# Patient Record
Sex: MALE | Race: WHITE | Employment: FULL TIME | URBAN - METROPOLITAN AREA
[De-identification: names, ages, dates, MRNs, and addresses within clinical notes are randomized per-mention and may not be internally consistent; named-entity substitution may affect disease eponyms.]

---

## 2022-05-27 ENCOUNTER — HOSPITAL ENCOUNTER (OUTPATIENT)
Age: 22
Discharge: HOME | End: 2022-05-27

## 2022-05-27 LAB
ALBUMIN SERPL-MCNC: 5 G/DL (ref 3.5–5.1)
ALP BLD-CCNC: 67 U/L (ref 38–126)
ALT SERPL-CCNC: 25 U/L (ref 11–66)
ANION GAP SERPL CALCULATED.3IONS-SCNC: 11 MEQ/L (ref 8–16)
AST SERPL-CCNC: 31 U/L (ref 5–40)
BASOPHILS # BLD: 0.7 %
BASOPHILS ABSOLUTE: 0 THOU/MM3 (ref 0–0.1)
BILIRUB SERPL-MCNC: 0.6 MG/DL (ref 0.3–1.2)
BUN BLDV-MCNC: 16 MG/DL (ref 7–22)
CALCIUM SERPL-MCNC: 9.6 MG/DL (ref 8.5–10.5)
CHLORIDE BLD-SCNC: 106 MEQ/L (ref 98–111)
CHOLESTEROL, TOTAL: 194 MG/DL (ref 100–199)
CO2: 25 MEQ/L (ref 23–33)
CREAT SERPL-MCNC: 0.9 MG/DL (ref 0.4–1.2)
EOSINOPHIL # BLD: 1.7 %
EOSINOPHILS ABSOLUTE: 0.1 THOU/MM3 (ref 0–0.4)
ERYTHROCYTE [DISTWIDTH] IN BLOOD BY AUTOMATED COUNT: 11.5 % (ref 11.5–14.5)
ERYTHROCYTE [DISTWIDTH] IN BLOOD BY AUTOMATED COUNT: 36.9 FL (ref 35–45)
GFR SERPL CREATININE-BSD FRML MDRD: > 90 ML/MIN/1.73M2
GLUCOSE BLD-MCNC: 83 MG/DL (ref 70–108)
HCT VFR BLD CALC: 49.6 % (ref 42–52)
HDLC SERPL-MCNC: 63 MG/DL
HEMOGLOBIN: 16.5 GM/DL (ref 14–18)
IMMATURE GRANS (ABS): 0.01 THOU/MM3 (ref 0–0.07)
IMMATURE GRANULOCYTES: 0.2 %
LDL CHOLESTEROL CALCULATED: 117 MG/DL
LYMPHOCYTES # BLD: 46.8 %
LYMPHOCYTES ABSOLUTE: 2.7 THOU/MM3 (ref 1–4.8)
MCH RBC QN AUTO: 29.5 PG (ref 26–33)
MCHC RBC AUTO-ENTMCNC: 33.3 GM/DL (ref 32.2–35.5)
MCV RBC AUTO: 88.7 FL (ref 80–94)
MONOCYTES # BLD: 9.1 %
MONOCYTES ABSOLUTE: 0.5 THOU/MM3 (ref 0.4–1.3)
NUCLEATED RED BLOOD CELLS: 0 /100 WBC
PLATELET # BLD: 240 THOU/MM3 (ref 130–400)
PMV BLD AUTO: 10 FL (ref 9.4–12.4)
POTASSIUM SERPL-SCNC: 4.1 MEQ/L (ref 3.5–5.2)
RBC # BLD: 5.59 MILL/MM3 (ref 4.7–6.1)
SEG NEUTROPHILS: 41.5 %
SEGMENTED NEUTROPHILS ABSOLUTE COUNT: 2.4 THOU/MM3 (ref 1.8–7.7)
SODIUM BLD-SCNC: 142 MEQ/L (ref 135–145)
TOTAL PROTEIN: 7.5 G/DL (ref 6.1–8)
TRIGL SERPL-MCNC: 69 MG/DL (ref 0–199)
WBC # BLD: 5.7 THOU/MM3 (ref 4.8–10.8)

## 2022-12-03 ENCOUNTER — HOSPITAL ENCOUNTER (EMERGENCY)
Age: 22
Discharge: HOME | End: 2022-12-03
Attending: EMERGENCY MEDICINE
Payer: COMMERCIAL

## 2022-12-03 ENCOUNTER — APPOINTMENT (OUTPATIENT)
Dept: GENERAL RADIOLOGY | Age: 22
End: 2022-12-03
Payer: COMMERCIAL

## 2022-12-03 VITALS
HEIGHT: 66 IN | TEMPERATURE: 98.9 F | HEART RATE: 72 BPM | SYSTOLIC BLOOD PRESSURE: 135 MMHG | DIASTOLIC BLOOD PRESSURE: 77 MMHG | OXYGEN SATURATION: 97 % | WEIGHT: 150 LBS | RESPIRATION RATE: 18 BRPM | BODY MASS INDEX: 24.11 KG/M2

## 2022-12-03 DIAGNOSIS — S46.002A ROTATOR CUFF INJURY, LEFT, INITIAL ENCOUNTER: Primary | ICD-10-CM

## 2022-12-03 DIAGNOSIS — S49.92XA SHOULDER INJURY, LEFT, INITIAL ENCOUNTER: ICD-10-CM

## 2022-12-03 PROCEDURE — 73030 X-RAY EXAM OF SHOULDER: CPT

## 2022-12-03 PROCEDURE — 6360000002 HC RX W HCPCS: Performed by: STUDENT IN AN ORGANIZED HEALTH CARE EDUCATION/TRAINING PROGRAM

## 2022-12-03 PROCEDURE — 99284 EMERGENCY DEPT VISIT MOD MDM: CPT

## 2022-12-03 PROCEDURE — 6370000000 HC RX 637 (ALT 250 FOR IP): Performed by: STUDENT IN AN ORGANIZED HEALTH CARE EDUCATION/TRAINING PROGRAM

## 2022-12-03 PROCEDURE — 96372 THER/PROPH/DIAG INJ SC/IM: CPT

## 2022-12-03 RX ORDER — HYDROCODONE BITARTRATE AND ACETAMINOPHEN 5; 325 MG/1; MG/1
1 TABLET ORAL EVERY 4 HOURS PRN
Qty: 18 TABLET | Refills: 0 | Status: SHIPPED | OUTPATIENT
Start: 2022-12-03 | End: 2022-12-06

## 2022-12-03 RX ORDER — KETOROLAC TROMETHAMINE 30 MG/ML
30 INJECTION, SOLUTION INTRAMUSCULAR; INTRAVENOUS ONCE
Status: COMPLETED | OUTPATIENT
Start: 2022-12-03 | End: 2022-12-03

## 2022-12-03 RX ORDER — HYDROCODONE BITARTRATE AND ACETAMINOPHEN 10; 325 MG/1; MG/1
1 TABLET ORAL ONCE
Status: COMPLETED | OUTPATIENT
Start: 2022-12-03 | End: 2022-12-03

## 2022-12-03 RX ORDER — KETOROLAC TROMETHAMINE 30 MG/ML
60 INJECTION, SOLUTION INTRAMUSCULAR; INTRAVENOUS ONCE
Status: DISCONTINUED | OUTPATIENT
Start: 2022-12-03 | End: 2022-12-03

## 2022-12-03 RX ADMIN — HYDROCODONE BITARTRATE AND ACETAMINOPHEN 1 TABLET: 10; 325 TABLET ORAL at 21:00

## 2022-12-03 RX ADMIN — KETOROLAC TROMETHAMINE 30 MG: 30 INJECTION, SOLUTION INTRAMUSCULAR at 21:03

## 2022-12-03 ASSESSMENT — PAIN SCALES - GENERAL
PAINLEVEL_OUTOF10: 10
PAINLEVEL_OUTOF10: 10

## 2022-12-03 ASSESSMENT — ENCOUNTER SYMPTOMS
GASTROINTESTINAL NEGATIVE: 1
RESPIRATORY NEGATIVE: 1
EYES NEGATIVE: 1

## 2022-12-03 ASSESSMENT — PAIN DESCRIPTION - LOCATION: LOCATION: SHOULDER

## 2022-12-03 ASSESSMENT — PAIN - FUNCTIONAL ASSESSMENT: PAIN_FUNCTIONAL_ASSESSMENT: 0-10

## 2022-12-03 ASSESSMENT — PAIN DESCRIPTION - ORIENTATION: ORIENTATION: LEFT

## 2022-12-03 NOTE — Clinical Note
Samina Rodriguez was seen and treated in our emergency department on 12/3/2022. He may return to work on 12/07/2022. However, patient may return sooner if his symptoms allow. If you have any questions or concerns, please don't hesitate to call.       Nina Bates MD

## 2022-12-04 NOTE — DISCHARGE INSTRUCTIONS
[facility] location and hours. [address] [facility], [address] [place] [address]  [phone]  Monday - Thursday  8:00 am - 4:00 pm  Friday 8:00am - 3:00pm    Remember, you got 325 mg of tylenol in the norco in the ED. And each norco has an additional 325 mg of tylenol. Stay below 4000 mg of tylenol in 24 hours. Do not drink alcohol during this period of time. Additionally, you can add either ibuprofen or aleve to help you with pain.

## 2022-12-04 NOTE — ED TRIAGE NOTES
Pt presents to the ER with a left shoulder injury from player football. Pt states he thinks his shoulder is \"popped out. \" No obvious deformity noted. VSS.

## 2022-12-04 NOTE — ED PROVIDER NOTES
PATIENT NAME: Kranthi Severino  MRN: [id number]  : 2000  AYALA: 12/3/2022    I performed a history and physical examination of the patient and discussed management with the Resident. I reviewed the Resident's note and agree with the documented findings and plan of care. Any areas of disagreement are noted on the chart. I was personally present for the key portions of any procedures and have documented in the chart those procedures where I was not present during the key portions. I have reviewed the emergency nurses triage note and agree with the chief complaint, past medical history, past surgical history, allergies, medications, social and family history as documented unless otherwise noted below. MEDICAL DEDISION MAKING (MDM)     Kranthi Severino is a 25 y.o. male who presents to Emergency Department with Shoulder Injury (Left)     Patient injured left shoulder while he was playing football. He will not use left shoulder due to severe pain. Pain is rated at 10/10. No left shoulder deformity  Physical exam: AVSS, cardiopulmonary exam unremarkable. Abdomen soft. Left shoulder has no swelling, no deformity, no range of motion due to severe pain. Patient has significant tenderness to left supraspinous tendon area. Left shoulder x-ray has no acute abnormality. Clinical I suspect this is rotator cuff injury. Arm sling is given ED. Discharged with orthopedic follow-up in a week. OTC ibuprofen for pain. Piyush Search is prescribed for pain breakthrough. Vitals:    22 1923   BP: 135/77   Pulse: 72   Resp: 18   Temp: 98.9 °F (37.2 °C)   TempSrc: Oral   SpO2: 97%   Weight: 150 lb (68 kg)   Height: 5' 6\" (1.676 m)     Medications   HYDROcodone-acetaminophen (NORCO)  MG per tablet 1 tablet (1 tablet Oral Given 12/3/22 2100)   ketorolac (TORADOL) injection 30 mg (30 mg IntraMUSCular Given 12/3/22 2103)     Labs Reviewed - No data to display  XR SHOULDER LEFT (MIN 2 VIEWS)   Final Result   1.  No acute

## 2022-12-04 NOTE — ED PROVIDER NOTES
[facility] ENCOUNTER          Pt Name: Kranthi Severino  MRN: [id number]  [place] 2000  Date of evaluation: 12/3/2022  Treating Resident Physician: Mukul Cavanaugh MD  Supervising Physician: Dr. Nusrat Love       Chief Complaint   Patient presents with    Shoulder Injury     Left         HISTORY OF PRESENT ILLNESS    HPI  Kranthi Severino is a 25 y.o. male who presents to the emergency department for evaluation of   Left shoulder pain that started after being hit in tackle football without pads today. Patient reports he felt a \"pop\" but was able to move his left shoulder and arm immediately after. However, patient reports pain has steadily increased since, now 10/10 along with swelling. He denies changes in sensation of his left arm. The patient has no other acute complaints at this time. REVIEW OF SYSTEMS   Review of Systems   Constitutional: Negative. HENT: Negative. Eyes: Negative. Respiratory: Negative. Cardiovascular: Negative. Gastrointestinal: Negative. Musculoskeletal:         Left shoulder pain       PAST MEDICAL AND SURGICAL HISTORY   No past medical history on file. No past surgical history on file. MEDICATIONS   No current facility-administered medications for this encounter. Current Outpatient Medications:     HYDROcodone-acetaminophen (NORCO) 5-325 MG per tablet, Take 1 tablet by mouth every 4 hours as needed for Pain for up to 3 days. Intended supply: 3 days. Take lowest dose possible to manage pain, Disp: 18 tablet, Rfl: 0      SOCIAL HISTORY     Social History     Social History Narrative    Not on file            ALLERGIES     Allergies   Allergen Reactions    Penicillins Hives         FAMILY HISTORY   No family history on file.       PHYSICAL EXAM     ED Triage Vitals [12/03/22 1923]   BP Temp Temp Source Heart Rate Resp SpO2 Height Weight   135/77 98.9 °F (37.2 °C) Oral 72 18 97 % 5' 6\" (1.676 m) 150 lb (68 kg)       Additional Vital Signs:  Vitals:    12/03/22 1923   BP: 135/77   Pulse: 72   Resp: 18   Temp: 98.9 °F (37.2 °C)   SpO2: 97%       Physical Exam:  GENERAL: Alert and oriented. Not moving left arm. EYES: No erythema, drainage, or icterus. HEART: Normal S1/S2. No murmurs. LUNGS: Clear to auscultation bilaterally. ABDOMEN: Soft and non-tender. EXTREMITIES: Tender to palpation in the posterior aspect of shoulder. Passive and active ROM is limited by significant pain. NEUROLOGICAL: Grossly normal. No changes in  strength of left hand compared to right. Full range of motion without pain at the elbow joint. No changes in sensation. SKIN: no rashes    MEDICAL DECISION MAKING   Initial Assessment:   24 yo male with trauma to his shoulder during football. Ddx acute fracture, dislocation, labral damage, rotator cuff injury. Plan:   Shoulder xray  Pain control      ED RESULTS   Laboratory results:  Labs Reviewed - No data to display    Radiologic studies results:  XR SHOULDER LEFT (MIN 2 VIEWS)   Final Result   1. No acute bony abnormality            **This report has been created using voice recognition software. It may contain minor errors which are inherent in voice recognition technology. **      Final report electronically signed by Dr Sarah Worrell on 12/3/2022 8:20 PM          ED Medications administered this visit:   Medications   HYDROcodone-acetaminophen (NORCO)  MG per tablet 1 tablet (1 tablet Oral Given 12/3/22 2100)   ketorolac (TORADOL) injection 30 mg (30 mg IntraMUSCular Given 12/3/22 2103)         ED COURSE      Shoulder xray was negative for dislocation or fracture. Strict return precautions regarding changes in sensation or severe increase in shoulder/arm pain, or weakness of left upper extremity were discusssed and follow up instructions were discussed with the patient prior to discharge, with which the patient agrees.       MEDICATION CHANGES     New Prescriptions    HYDROCODONE-ACETAMINOPHEN (NORCO) 5-325 MG PER TABLET    Take 1 tablet by mouth every 4 hours as needed for Pain for up to 3 days. Intended supply: 3 days. Take lowest dose possible to manage pain         FINAL DISPOSITION     Final diagnoses:   Rotator cuff injury, left, initial encounter   Shoulder injury, left, initial encounter     Will prescribe 3 day course of norco. Advised patient to use tylenol and ibuprofen as well. Patient agreeable to walk in clinic at [facility] on Monday for further evaluation. Cautioned patient against drinking while on tylenol. Discussed tylenol as an ingredient in norco prescribed and to stay under 4000 mg daily total while adding all sources of tylenol. Condition: condition: stable  Dispo: Discharge to home    Follow-up Plans:  [address]  Go on 12/5/2022  to walk in clinic to be evaluated      This transcription was electronically signed. Parts of this transcriptions may have been dictated by use of voice recognition software and electronically transcribed, and parts may have been transcribed with the assistance of an ED scribe. The transcription may contain errors not detected in proofreading. Please refer to my supervising physician's documentation if my documentation differs.          Latrice Nguyễn MD  Resident  12/03/22 0643

## 2023-12-27 ENCOUNTER — HOSPITAL ENCOUNTER (EMERGENCY)
Age: 23
Discharge: HOME | End: 2023-12-28
Attending: EMERGENCY MEDICINE
Payer: COMMERCIAL

## 2023-12-27 DIAGNOSIS — R42 DIZZINESS: Primary | ICD-10-CM

## 2023-12-27 PROCEDURE — 99284 EMERGENCY DEPT VISIT MOD MDM: CPT

## 2023-12-27 PROCEDURE — 93005 ELECTROCARDIOGRAM TRACING: CPT | Performed by: EMERGENCY MEDICINE

## 2023-12-28 VITALS
WEIGHT: 145 LBS | DIASTOLIC BLOOD PRESSURE: 72 MMHG | TEMPERATURE: 98.7 F | RESPIRATION RATE: 16 BRPM | BODY MASS INDEX: 23.4 KG/M2 | OXYGEN SATURATION: 98 % | SYSTOLIC BLOOD PRESSURE: 120 MMHG | HEART RATE: 72 BPM

## 2023-12-28 LAB
ANION GAP SERPL CALC-SCNC: 11 MEQ/L (ref 8–16)
BASOPHILS ABSOLUTE: 0 THOU/MM3 (ref 0–0.1)
BASOPHILS NFR BLD AUTO: 0.4 %
BUN SERPL-MCNC: 21 MG/DL (ref 7–22)
CALCIUM SERPL-MCNC: 9.4 MG/DL (ref 8.5–10.5)
CHLORIDE SERPL-SCNC: 104 MEQ/L (ref 98–111)
CO2 SERPL-SCNC: 23 MEQ/L (ref 23–33)
CREAT SERPL-MCNC: 0.8 MG/DL (ref 0.4–1.2)
DEPRECATED RDW RBC AUTO: 36.8 FL (ref 35–45)
EOSINOPHIL NFR BLD AUTO: 1.1 %
EOSINOPHILS ABSOLUTE: 0.1 THOU/MM3 (ref 0–0.4)
ERYTHROCYTE [DISTWIDTH] IN BLOOD BY AUTOMATED COUNT: 11.5 % (ref 11.5–14.5)
FLUAV RNA RESP QL NAA+PROBE: NOT DETECTED
FLUBV RNA RESP QL NAA+PROBE: NOT DETECTED
GFR SERPL CREATININE-BSD FRML MDRD: > 60 ML/MIN/1.73M2
GLUCOSE SERPL-MCNC: 87 MG/DL (ref 70–108)
HCT VFR BLD AUTO: 45.8 % (ref 42–52)
HGB BLD-MCNC: 15.5 GM/DL (ref 14–18)
IMM GRANULOCYTES # BLD AUTO: 0.02 THOU/MM3 (ref 0–0.07)
IMM GRANULOCYTES NFR BLD AUTO: 0.2 %
LYMPHOCYTES ABSOLUTE: 2.7 THOU/MM3 (ref 1–4.8)
LYMPHOCYTES NFR BLD AUTO: 28.9 %
MCH RBC QN AUTO: 29.6 PG (ref 26–33)
MCHC RBC AUTO-ENTMCNC: 33.8 GM/DL (ref 32.2–35.5)
MCV RBC AUTO: 87.6 FL (ref 80–94)
MONOCYTES ABSOLUTE: 0.6 THOU/MM3 (ref 0.4–1.3)
MONOCYTES NFR BLD AUTO: 6.8 %
NEUTROPHILS NFR BLD AUTO: 62.6 %
NRBC BLD AUTO-RTO: 0 /100 WBC
OSMOLALITY SERPL CALC.SUM OF ELEC: 278 MOSMOL/KG (ref 275–300)
PLATELET # BLD AUTO: 250 THOU/MM3 (ref 130–400)
PMV BLD AUTO: 9.9 FL (ref 9.4–12.4)
POTASSIUM SERPL-SCNC: 4 MEQ/L (ref 3.5–5.2)
RBC # BLD AUTO: 5.23 MILL/MM3 (ref 4.7–6.1)
SARS-COV-2 RNA RESP QL NAA+PROBE: NOT DETECTED
SEGMENTED NEUTROPHILS ABSOLUTE COUNT: 5.9 THOU/MM3 (ref 1.8–7.7)
SODIUM SERPL-SCNC: 138 MEQ/L (ref 135–145)
WBC # BLD AUTO: 9.4 THOU/MM3 (ref 4.8–10.8)

## 2023-12-28 PROCEDURE — 85025 COMPLETE CBC W/AUTO DIFF WBC: CPT

## 2023-12-28 PROCEDURE — 93010 ELECTROCARDIOGRAM REPORT: CPT | Performed by: INTERNAL MEDICINE

## 2023-12-28 PROCEDURE — 80048 BASIC METABOLIC PNL TOTAL CA: CPT

## 2023-12-28 PROCEDURE — 36415 COLL VENOUS BLD VENIPUNCTURE: CPT

## 2023-12-28 PROCEDURE — 87636 SARSCOV2 & INF A&B AMP PRB: CPT

## 2023-12-28 NOTE — DISCHARGE INSTR - COC
intake/output data recorded.     Safety Concerns:     [address] [place] [address] Safety Concerns:438124105}    Impairments/Disabilities:      [address] [place] [address] Impairments/Disabilities:517279815}    Nutrition Therapy:  Current Nutrition Therapy:   [address] [place] [address] Diet List:929314394}    Routes of Feeding: {CHP DME Other Feedings:034917239}  Liquids: {Slp liquid thickness:26467}  Daily Fluid Restriction: {CHP DME Yes amt example:698059212}  Last Modified Barium Swallow with Video (Video Swallowing Test): {Done Not Done TXTS:850112004}    Treatments at the Time of Hospital Discharge:   Respiratory Treatments: ***  Oxygen Therapy:  {Therapy; copd oxygen:10118}  Ventilator:    {MH CC Vent DQAX:981365405}    Rehab Therapies: {THERAPEUTIC INTERVENTION:4052933318}  Weight Bearing Status/Restrictions: [address] [place] [address] Weight Bearin}  Other Medical Equipment (for information only, NOT a DME order):  {EQUIPMENT:159119995}  Other Treatments: ***    Patient's personal belongings (please select all that are sent with patient):  {P DME Belongings:297172239}    RN SIGNATURE:  {Esignature:345514190}    CASE MANAGEMENT/SOCIAL WORK SECTION    Inpatient Status Date: ***    Readmission Risk Assessment Score:  Readmission Risk              Risk of Unplanned Readmission:  0           Discharging to Facility/ Agency   Name:   Address:  Phone:  Fax:    Dialysis Facility (if applicable)   Name:  Address:  Dialysis Schedule:  Phone:  Fax:    / signature: {Esignature:889852561}    PHYSICIAN SECTION    Prognosis: {Prognosis:2938486926}    Condition at Discharge: [address] [place] [address] Patient Condition:028104733}    Rehab Potential (if transferring to Rehab): {Prognosis:0790039200}    Recommended Labs or Other Treatments After Discharge: ***    Physician Certification: I certify the above information and transfer of Ruth Chaudhari  is necessary for the continuing treatment of the diagnosis listed and that he requires {Admit to Appropriate Level of Care:45648} for

## 2023-12-28 NOTE — ED TRIAGE NOTES
Patient to the ED via ems with chief complaint of dizziness and nausea. Per patient, he bent off at work to  a case of water and felt nauseous and lightheaded following the movement. Patient denies CP, sob or other complaints at this time. EKG complete.

## 2023-12-28 NOTE — DISCHARGE INSTRUCTIONS
Return to the Emergency Department immediately if you develop worsenign or recurrent dizziness, headache, vomiting, visual changes , or you have any other concerns. Please follow up with your primary care doctor in 2-3 days.

## 2023-12-28 NOTE — ED PROVIDER NOTES
Cardiovascular:      Rate and Rhythm: Normal rate and regular rhythm. Heart sounds: Normal heart sounds. No murmur heard. No friction rub. No gallop. Pulmonary:      Effort: Pulmonary effort is normal. No respiratory distress. Breath sounds: Normal breath sounds. No stridor. No wheezing or rales. Abdominal:      General: Bowel sounds are normal. There is no distension. Palpations: Abdomen is soft. There is no mass. Tenderness: There is no abdominal tenderness. There is no guarding or rebound. Comments: Negative Ramirez's sign  Nontender McBurney's Point  Negative Rovsig's sign  No bruising or echymosis of abdomen   Musculoskeletal:         General: No tenderness. Cervical back: Normal range of motion and neck supple. Comments: Negative Jarvis's Sign bilaterally   Lymphadenopathy:      Cervical: No cervical adenopathy. Skin:     General: Skin is warm and dry. Coloration: Skin is not pale. Findings: No erythema or rash. Neurological:      Mental Status: He is alert and oriented to person, place, and time. Cranial Nerves: No cranial nerve deficit. Motor: No abnormal muscle tone. Coordination: Coordination normal.      Comments: No nystagmus   Psychiatric:         Behavior: Behavior normal.         Thought Content: Thought content normal.         FORMAL DIAGNOSTIC RESULTS     RADIOLOGY: Interpretation per the Radiologist below, if available at the time of this note (none if blank):     No orders to display       LABS: (none if blank)  Labs Reviewed   COVID-19 & INFLUENZA COMBO   CBC WITH AUTO DIFFERENTIAL   BASIC METABOLIC PANEL   ANION GAP   GLOMERULAR FILTRATION RATE, ESTIMATED   OSMOLALITY       (Any cultures that may have been sent were not resulted at the time of this patient visit)    [facility] / ED COURSE:   FIDE  /   Sherren Bowen Reviewed:    Vitals:    12/28/23 0002 12/28/23 0108 12/28/23 0151   BP: (!) 151/93 129/75 120/72   Pulse:

## 2023-12-30 LAB
EKG ATRIAL RATE: 80 BPM
EKG P AXIS: 49 DEGREES
EKG P-R INTERVAL: 180 MS
EKG Q-T INTERVAL: 352 MS
EKG QRS DURATION: 90 MS
EKG QTC CALCULATION (BAZETT): 405 MS
EKG R AXIS: 71 DEGREES
EKG T AXIS: 62 DEGREES
EKG VENTRICULAR RATE: 80 BPM